# Patient Record
Sex: MALE
[De-identification: names, ages, dates, MRNs, and addresses within clinical notes are randomized per-mention and may not be internally consistent; named-entity substitution may affect disease eponyms.]

---

## 2022-09-07 ENCOUNTER — HOSPITAL ENCOUNTER (EMERGENCY)
Dept: HOSPITAL 47 - EC | Age: 37
Discharge: HOME | End: 2022-09-07
Payer: COMMERCIAL

## 2022-09-07 ENCOUNTER — HOSPITAL ENCOUNTER (EMERGENCY)
Dept: HOSPITAL 47 - EC | Age: 37
LOS: 1 days | Discharge: HOME | End: 2022-09-08
Payer: COMMERCIAL

## 2022-09-07 VITALS — TEMPERATURE: 98.2 F

## 2022-09-07 VITALS — TEMPERATURE: 98.1 F

## 2022-09-07 DIAGNOSIS — Z88.8: ICD-10-CM

## 2022-09-07 DIAGNOSIS — F19.10: Primary | ICD-10-CM

## 2022-09-07 DIAGNOSIS — F17.209: ICD-10-CM

## 2022-09-07 DIAGNOSIS — I10: ICD-10-CM

## 2022-09-07 DIAGNOSIS — F12.90: ICD-10-CM

## 2022-09-07 DIAGNOSIS — S90.821A: Primary | ICD-10-CM

## 2022-09-07 DIAGNOSIS — F31.9: ICD-10-CM

## 2022-09-07 DIAGNOSIS — F17.290: ICD-10-CM

## 2022-09-07 PROCEDURE — 99284 EMERGENCY DEPT VISIT MOD MDM: CPT

## 2022-09-07 PROCEDURE — 82075 ASSAY OF BREATH ETHANOL: CPT

## 2022-09-07 PROCEDURE — 80306 DRUG TEST PRSMV INSTRMNT: CPT

## 2022-09-07 NOTE — ED
General Adult HPI





- General


Chief complaint: Psychiatric Symptoms


Stated complaint: Mental Health


Time Seen by Provider: 09/07/22 20:30


Source: police, RN notes reviewed


Mode of arrival: ambulatory


Limitations: no limitations





- History of Present Illness


Initial comments: 





37-year-old male presents to the emergency department requesting mental health 

evaluation.  Patient states he has a history of alcohol and substance abuse and 

has been "working on getting clean."  Denies any paranoia or delusions.  No 

suicidal or homicidal thoughts.  


Also states he has been walking a lot today and has developed a large blister on

the heel of his right foot for which he was seen earlier today.  Requesting to 

have it re-evaluated as it continues to be painful. Denies any injury, trauma, 

redness, swelling, or fevers.





- Related Data


                                    Allergies











Allergy/AdvReac Type Severity Reaction Status Date / Time


 


risperidone [From Risperdal] AdvReac  Rash/Hives Verified 09/07/22 20:22














Review of Systems


ROS Statement: 


Those systems with pertinent positive or pertinent negative responses have been 

documented in the HPI.





ROS Other: All systems not noted in ROS Statement are negative.





Past Medical History


Past Medical History: Hypertension


History of Any Multi-Drug Resistant Organisms: None Reported


Past Surgical History: Adenoidectomy, Tonsillectomy


Past Psychological History: Bipolar


Smoking Status: Current every day smoker, Heavy tobacco smoker, Vaper


Past Alcohol Use History: None Reported


Past Drug Use History: Cocaine, Heroin, IV Drug Use, Marijuana, Methamphetamine,

Opiates, Prescription Drug Abuse





General Exam


Limitations: no limitations (Well-developed, well nourished male in no acute 

distress.)


General appearance: alert, in no apparent distress


Respiratory exam: Present: normal lung sounds bilaterally.  Absent: respiratory 

distress, wheezes, rales, rhonchi, stridor


Cardiovascular Exam: Present: regular rate, normal rhythm, normal heart sounds. 

Absent: systolic murmur, diastolic murmur, rubs, gallop, clicks


  ** Right


Ankle exam: Present: normal inspection, full ROM.  Absent: tenderness, swelling


Foot/Toe exam: Present: full ROM.  Absent: normal inspection (Blister formation 

under callus on plantar surface of right heel approx 1.5cm in diameter. 

Fluctuant upon palpation. No surrounding erythema or swelling.)


Neurovascular tendon exam: Present: no vascular compromise


Neurological exam: Present: alert, oriented X3, CN II-XII intact


Psychiatric exam: Present: flat affect


  ** Expanded


Focused psych exam: Present: restlessness.  Absent: delusional, paranoid


Skin exam: Present: warm, dry, intact, normal color





Course


                                   Vital Signs











  09/07/22 09/07/22 09/08/22





  20:18 21:36 03:00


 


Temperature 98.1 F  


 


Pulse Rate 98 68 81


 


Respiratory 20 16 17





Rate   


 


Blood Pressure 164/103 175/88 152/78


 


O2 Sat by Pulse 99 99 97





Oximetry   














Procedures





- Procedures


Initial comment: 





Procedure explained and consent obtained.  Site was thoroughly cleansed.  22-

gauge needle inserted at the base of blister measuring approximately 1.5 cm in 

diameter.  1.8ml then bloody fluid aspirated without complication or difficulty.

Bacitracin dressing applied.  Patient instructed on proper wound care and 

encouraged to obtain well-fitting shoes. 





Medical Decision Making





- Medical Decision Making





This is a 37-year-old male with past medical history of polysubstance abuse who 

presents to the emergency department requesting a mental health evaluation, and 

for reassessment of the blister on the plantar surface of the right foot.  Upon 

exam, patient is well-appearing and in no acute distress.  He denies thoughts of

causing harm to himself or anyone else.  Denies recent ingestion of a drugs or 

alcohol.  EPS evaluated patient and cleared him for discharge home to follow up 

on an outpatient basis.


Thin bloody fluid was aspirated from base of friction blister.  No surrounding 

erythema or concerns for cellulitis or infection.  Bacitracin dressing applied. 

Patient reports improvement.  He will be discharged home with instructions to 

obtain well fitting shoes and to consider wearing an extra per of socks for 

additional cushion.  Return parameters were discussed in detail.  Patient eliza polk understanding and agrees with this plan.  Attending:Arina.





- Lab Data


                                   Lab Results











  09/07/22 Range/Units





  21:34 


 


Urine Opiates Screen  Not Detected  (NotDetected)  


 


Ur Oxycodone Screen  Not Detected  (NotDetected)  


 


Urine Methadone Screen  Not Detected  (NotDetected)  


 


Ur Propoxyphene Screen  Not Detected  (NotDetected)  


 


Ur Barbiturates Screen  Not Detected  (NotDetected)  


 


U Tricyclic Antidepress  Detected H  (NotDetected)  


 


Ur Phencyclidine Scrn  Not Detected  (NotDetected)  


 


Ur Amphetamines Screen  Not Detected  (NotDetected)  


 


U Methamphetamines Scrn  Not Detected  (NotDetected)  


 


U Benzodiazepines Scrn  Not Detected  (NotDetected)  


 


Urine Cocaine Screen  Not Detected  (NotDetected)  


 


U Marijuana (THC) Screen  Not Detected  (NotDetected)  














Disposition


Clinical Impression: 


 Friction blister without infection, Mental health disorder





Disposition: HOME SELF-CARE


Condition: Stable


Instructions (If sedation given, give patient instructions):  Blister (ED)


Additional Instructions: 


Do your best to obtain well-fitting shoes and wear dry socks.


Monitor for any signs of increased redness or foul smelling drainage from 

blister.


Continue taking your home medications as prescribed.


Follow-up with your PCP for a recheck as needed for your blister.


See your counselor or mental health provider as scheduled.


Return to the emergency department with any new, worsening, or concerning 

symptoms such as thoughts of causing harm to herself or anyone else.





Is patient prescribed a controlled substance at d/c from ED?: No


Referrals: 


None,Stated [Primary Care Provider] - 1-2 days


Time of Disposition: 02:57

## 2022-09-07 NOTE — ED
General Adult HPI





- General


Chief complaint: Psychiatric Symptoms


Stated complaint: foot pain


Time Seen by Provider: 09/07/22 15:45


Source: patient, RN notes reviewed, old records reviewed


Mode of arrival: EMS


Limitations: no limitations





- History of Present Illness


Initial comments: 





This is a 37 year old male presents emergency department stating that he is 

multi-substance abuser.  Patient states he was a cigarette for 4 days he decided

that they were helping enough so he just packed up his stuff and left.  Patient 

states then he uses phone and called 911 to come to the hospital because he 

wants us to do his rehabilitation here.  Patient has no complaint other than he 

has a callus on the back of his right heel.  Patient states that's from walk 

around on the backs of his shoes.  Patient denies any other problems or 

symptoms.  Patient states not homicidal or suicidal.  Patient denies delusions 

or any hallucinations.  Patient is alert and oriented 3





- Related Data


                                    Allergies











Allergy/AdvReac Type Severity Reaction Status Date / Time


 


risperidone [From Risperdal] AdvReac  Rash/Hives Verified 09/07/22 14:23














Review of Systems


ROS Statement: 


Those systems with pertinent positive or pertinent negative responses have been 

documented in the HPI.





ROS Other: All systems not noted in ROS Statement are negative.





Past Medical History


Past Medical History: Hypertension


Past Surgical History: No Surgical Hx Reported, Adenoidectomy, Tonsillectomy


Past Psychological History: Bipolar


Smoking Status: Current every day smoker, Heavy tobacco smoker, Vaper


Past Alcohol Use History: None Reported


Past Drug Use History: Cocaine, Heroin, IV Drug Use, Marijuana, Methamphetamine,

Opiates, Prescription Drug Abuse





General Exam





- General Exam Comments


Initial Comments: 





GENERAL:


Patient is well-developed and well-nourished.  Patient is nontoxic and well-

hydrated and is in no acute distress.





ENT:


Neck is soft and supple.  No significant lymphadenopathy is noted.  Oropharynx 

is clear.  Moist mucous membranes.  Neck has full range of motion without 

eliciting any pain. 





EYES:


The sclera were anicteric and conjunctiva were pink and moist.  Extraocular 

movements were intact and pupils were equal round and reactive to light.  

Eyelids were unremarkable.





PULMONARY:


Unlabored respirations.  Good breath sounds bilaterally.  No audible rales 

rhonchi or wheezing was noted.





CARDIOVASCULAR:


There is a regular rate and rhythm without any murmurs gallops or rubs.  





ABDOMEN:


Soft and nontender with normal bowel sounds. 





SKIN:


Skin is clear with no lesions or rashes and otherwise unremarkable.





NEUROLOGIC:


Patient is alert and oriented x3.  Cranial nerves II through XII are grossly 

intact.  Motor and sensory are also intact.  Normal speech, volume and content. 

Symmetrical smile. 





MUSCULOSKELETAL:


Normal extremities with adequate strength and full range of motion.  Patient has

a callus on the posterior aspect of his right heel





LYMPHATICS:


No significant lymphadenopathy is noted





PSYCHIATRIC:


Normal psychiatric evaluation.  Patient denies any suicidal homicidal ideations.

 Patient denies any hallucinations.


Limitations: no limitations





Course





                                   Vital Signs











  09/07/22





  14:19


 


Temperature 98.2 F














Disposition


Clinical Impression: 


 Polysubstance abuse





Disposition: HOME SELF-CARE


Condition: Good


Instructions (If sedation given, give patient instructions):  Polysubstance 

Abuse (ED)


Is patient prescribed a controlled substance at d/c from ED?: No


Referrals: 


None,Stated [Primary Care Provider] - 1-2 days


Time of Disposition: 15:59

## 2022-09-08 VITALS — HEART RATE: 81 BPM | SYSTOLIC BLOOD PRESSURE: 152 MMHG | RESPIRATION RATE: 17 BRPM | DIASTOLIC BLOOD PRESSURE: 78 MMHG
